# Patient Record
Sex: MALE | Race: WHITE | NOT HISPANIC OR LATINO | Employment: PART TIME | URBAN - METROPOLITAN AREA
[De-identification: names, ages, dates, MRNs, and addresses within clinical notes are randomized per-mention and may not be internally consistent; named-entity substitution may affect disease eponyms.]

---
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
## 2023-01-03 ENCOUNTER — VIRTUAL VISIT (OUTPATIENT)
Dept: PSYCHIATRY | Facility: CLINIC | Age: 18
End: 2023-01-03
Payer: COMMERCIAL

## 2023-01-03 DIAGNOSIS — F41.9 ANXIETY: ICD-10-CM

## 2023-01-03 DIAGNOSIS — F33.1 MAJOR DEPRESSIVE DISORDER, RECURRENT EPISODE, MODERATE (H): ICD-10-CM

## 2023-01-03 DIAGNOSIS — F60.9 CLUSTER B PERSONALITY DISORDER IN ADOLESCENT (H): ICD-10-CM

## 2023-01-03 DIAGNOSIS — F39 MOOD DISORDER (H): Primary | ICD-10-CM

## 2023-01-03 DIAGNOSIS — Z63.8 FAMILY DISCORD: ICD-10-CM

## 2023-01-03 DIAGNOSIS — F90.2 ATTENTION DEFICIT HYPERACTIVITY DISORDER (ADHD), COMBINED TYPE: ICD-10-CM

## 2023-01-03 PROCEDURE — 99214 OFFICE O/P EST MOD 30 MIN: CPT | Mod: GT | Performed by: STUDENT IN AN ORGANIZED HEALTH CARE EDUCATION/TRAINING PROGRAM

## 2023-01-03 RX ORDER — GUANFACINE 3 MG/1
3 TABLET, EXTENDED RELEASE ORAL AT BEDTIME
Qty: 30 TABLET | Refills: 3 | Status: SHIPPED | OUTPATIENT
Start: 2023-01-03 | End: 2023-02-21

## 2023-01-03 RX ORDER — FLUOXETINE 40 MG/1
40 CAPSULE ORAL EVERY EVENING
Qty: 30 CAPSULE | Refills: 3 | Status: SHIPPED | OUTPATIENT
Start: 2023-01-03 | End: 2023-02-21

## 2023-01-03 SDOH — SOCIAL STABILITY - SOCIAL INSECURITY: OTHER SPECIFIED PROBLEMS RELATED TO PRIMARY SUPPORT GROUP: Z63.8

## 2023-01-03 NOTE — PATIENT INSTRUCTIONS
**For crisis resources, please see the information at the end of this document**   Patient Education    Thank you for coming to the [facility].    Lab Testing:  If you had lab testing today and your results are reassuring or normal they will be mailed to you or sent through [employer] within 7 days. If the lab tests need quick action we will call you with the results. The phone number we will call with results is # [phone] (home) . If this is not the best number please call our clinic and change the number.    Medication Refills:  If you need any refills please call your pharmacy and they will contact us. Our fax number for refills is [phone]. Please allow three business for refill processing. If you need to  your refill at a new pharmacy, please contact the new pharmacy directly. The new pharmacy will help you get your medications transferred.     Scheduling:  If you have any concerns about today's visit or wish to schedule another appointment please call our office during normal business hours [phone] (8-5:00 M-F)    Contact Us:  Please call [phone] during business hours (8-5:00 M-F).  If after clinic hours, or on the weekend, please call  [phone].    Financial Assistance [phone]  [facility] Billing [phone]  Central Billing Office, [facility]: [phone]  [facility] Billing [phone]  Medical Records [phone]  [facility] Patient Bill of Rights [email or web address]       MENTAL HEALTH CRISIS NUMBERS:  For a medical emergency please call  911 or go to the nearest ER.     [facility]:   [facility] -[phone]   Crisis Residence [facility] -[phone]   Walk-In Counseling Center [facility] -[phone]   COPE 24/7 [facility] Mobile Team -[phone] (adults)/[phone] (child)  CHILD: [facility] needs assessment team - [phone]       [facility]:   [facility] - [phone]   Walk-in counseling [facility] - [phone]   Walk-in counseling [facility] - [phone]   Crisis Residence [facility] - [phone]  Urgent Care Adult Mental Tbsrqy-[phone] mobile unit/ 24/7 crisis line    National Crisis Numbers:   National Suicide Prevention Lifeline: 7-578-795-TALK (595-259-0953)  Poison Control Center - 1-320-414-7806  [facility] [place]/resources for a list of additional resources (SOS)  Trans Lifeline a hotline for transgender people 2-152-764-5270  The [employer] a hotline for LGBT youth 1-221.220.7479  Crisis Text Line: For any crisis 24/7   To: 247877  see www.crisistextline.org  - IF MAKING A CALL FEELS TOO HARD, send a text!         Again thank you for choosing [facility] and please let us know how we can best partner with you to improve you and your family's health.    You may be receiving a survey regarding this appointment. We would love to have your feedback, both positive and negative. The survey is done by an external company, so your answers are anonymous.

## 2023-01-03 NOTE — PROGRESS NOTES
Kyle Bhakta is a 17 year old male who is being evaluated via a billable video visit.        How would you like to obtain your AVS? through [employer]  Primary method for receiving video invitation: Text to cell phone: [phone]  If the video visit is dropped, the invitation should be resent by: Text to cell phone: [phone]  Will anyone else be joining your video visit? No      Type of service:  Video Visit    Video-Visit Details    Video Start Time: 1:40 PM    Video End Time:1:53 PM  Originating Location (pt. Location): Other patient's workplace    Distant Location (provider location):  [facility]    Platform used for Video Visit: Juanita
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
## 2023-01-07 ENCOUNTER — HEALTH MAINTENANCE LETTER (OUTPATIENT)
Age: 18
End: 2023-01-07

## 2023-02-04 ENCOUNTER — OFFICE VISIT (OUTPATIENT)
Dept: URGENT CARE | Facility: URGENT CARE | Age: 18
End: 2023-02-04
Payer: COMMERCIAL

## 2023-02-04 VITALS
RESPIRATION RATE: 22 BRPM | SYSTOLIC BLOOD PRESSURE: 138 MMHG | TEMPERATURE: 97.6 F | DIASTOLIC BLOOD PRESSURE: 87 MMHG | HEART RATE: 79 BPM | BODY MASS INDEX: 44.75 KG/M2 | WEIGHT: 315 LBS | OXYGEN SATURATION: 96 %

## 2023-02-04 DIAGNOSIS — J01.90 ACUTE SINUSITIS WITH SYMPTOMS > 10 DAYS: ICD-10-CM

## 2023-02-04 DIAGNOSIS — J02.0 STREPTOCOCCAL PHARYNGITIS: Primary | ICD-10-CM

## 2023-02-04 DIAGNOSIS — R07.0 THROAT PAIN: ICD-10-CM

## 2023-02-04 DIAGNOSIS — Z20.822 SUSPECTED 2019 NOVEL CORONAVIRUS INFECTION: ICD-10-CM

## 2023-02-04 LAB — DEPRECATED S PYO AG THROAT QL EIA: POSITIVE

## 2023-02-04 PROCEDURE — U0003 INFECTIOUS AGENT DETECTION BY NUCLEIC ACID (DNA OR RNA); SEVERE ACUTE RESPIRATORY SYNDROME CORONAVIRUS 2 (SARS-COV-2) (CORONAVIRUS DISEASE [COVID-19]), AMPLIFIED PROBE TECHNIQUE, MAKING USE OF HIGH THROUGHPUT TECHNOLOGIES AS DESCRIBED BY CMS-2020-01-R: HCPCS | Performed by: FAMILY MEDICINE

## 2023-02-04 PROCEDURE — 87880 STREP A ASSAY W/OPTIC: CPT | Performed by: FAMILY MEDICINE

## 2023-02-04 PROCEDURE — 99213 OFFICE O/P EST LOW 20 MIN: CPT | Mod: CS | Performed by: FAMILY MEDICINE

## 2023-02-04 PROCEDURE — U0005 INFEC AGEN DETEC AMPLI PROBE: HCPCS | Performed by: FAMILY MEDICINE

## 2023-02-04 NOTE — PROGRESS NOTES
Assessment & Plan     Streptococcal pharyngitis  Differential discussed in detail.  Symptoms likely secondary to strep pharyngitis along with acute sinusitis.  Augmentin prescribed, common side effects discussed.  Recommended well hydration, warm fluids, steam inhalation, humidifier use and over-the-counter analgesia/antitussive.  Return criteria explained in detail.  Patient understood and in agreement with above plan.  All questions answered.  - amoxicillin-clavulanate (AUGMENTIN) 875-125 MG tablet; Take 1 tablet by mouth 2 times daily for 10 days    Suspected 2019 novel coronavirus infection  - Symptomatic COVID-19 Virus (Coronavirus) by PCR Nose    Acute sinusitis with symptoms > 10 days  - amoxicillin-clavulanate (AUGMENTIN) 875-125 MG tablet; Take 1 tablet by mouth 2 times daily for 10 days    Throat pain  - Streptococcus A Rapid Screen w/Reflex to PCR      Gonzalo Montana MD  [facility] URG[facility]   Ana is a 17 year old presenting for the following health issues:  Sinus Problem (Sinus congestion, headache, and dizziness ), Cough (Coughing, lightheaded for about 3 days), and Pharyngitis (Sore throat.)      HPI     Concern -   Onset: >1 week  Description: ear pain, sinu congestion, cough, sore throat, weakness, fatigue, nausea, hard time remembering   Intensity: moderate  Progression of Symptoms:  worsening  Accompanying Signs & Symptoms:none  Therapies tried and outcome: sleep, tylenol   Works in         Review of Systems   Constitutional, HEENT, cardiovascular, pulmonary, gi and gu systems are negative, except as otherwise noted.      Objective    /87 (BP Location: Right arm, Patient Position: Sitting, Cuff Size: Adult Large)   Pulse 79   Temp 97.6  F (36.4  C) (Tympanic)   Resp 22   Wt 149.7 kg (330 lb)   SpO2 96%   BMI 44.75 kg/m    Body mass index is 44.75 kg/m .  Physical Exam   GENERAL: alert, no distress and obese  EYES: Eyes grossly normal to  inspection, PERRL and conjunctivae and sclerae normal  HENT: normal cephalic/atraumatic, ear canals and TM's normal, nasal mucosa edematous , rhinorrhea yellow, oral mucous membranes moist, oropharxnx crowded, tonsillar hypertrophy and tonsillar erythema, maxillary sinus tender bilaterally  NECK: no adenopathy, no asymmetry, masses, or scars and thyroid normal to palpation  RESP: lungs clear to auscultation - no rales, rhonchi or wheezes  CV: regular rates and rhythm, normal S1 S2, no S3 or S4 and no murmur, click or rub  MS: no gross musculoskeletal defects noted, no edema  SKIN: no suspicious lesions or rashes  NEURO: Normal strength and tone, mentation intact and speech normal  PSYCH: mentation appears normal, affect normal/bright        Results for orders placed or performed in visit on 02/04/23   Streptococcus A Rapid Screen w/Reflex to PCR     Status: Abnormal    Specimen: Throat; Swab   Result Value Ref Range    Group A Strep antigen Positive (A) Negative

## 2023-02-04 NOTE — LETTER
February 10, 2023      Ana SYLVESTER Bhakta  [address]   [place] [address]        Dear ,    We are writing to inform you of your test results.    {results letter list:[id number]}    Resulted Orders   Symptomatic COVID-19 Virus (Coronavirus) by PCR Nose   Result Value Ref Range    SARS CoV2 PCR Negative Negative      Comment:      NEGATIVE: SARS-CoV-2 (COVID-19) RNA not detected, presumed negative.    Narrative    Testing was performed using the AptOneSpot SARS-CoV-2 Assay on the  [employer] Instrument System. Additional information about this  Emergency Use Authorization (EUA) assay can be found via the Lab  Guide. This test should be ordered for the detection of SARS-CoV-2 in  individuals who meet SARS-CoV-2 clinical and/or epidemiological  criteria. Test performance is unknown in asymptomatic patients. This  test is for in vitro diagnostic use under the FDA EUA for  laboratories certified under CLIA to perform high complexity testing.  This test has not been FDA cleared or approved. A negative result  does not rule out the presence of PCR inhibitors in the specimen or  target RNA in concentration below the limit of detection for the  assay. The possibility of a false negative should be considered if  the patient's recent exposure or clinical presentation suggests  COVID-19. This test was validated by the [facility]. This laboratory is certified under  the Clinical Laboratory Improvement Amendments of 1988 (CLIA-88) as  qualified to perform high complexity laboratory testing.       If you have any questions or concerns, please call the clinic at the number listed above.       Sincerely,      Gonzalo Montana MD

## 2023-02-05 LAB — SARS-COV-2 RNA RESP QL NAA+PROBE: NEGATIVE

## 2023-02-16 ENCOUNTER — TELEPHONE (OUTPATIENT)
Dept: PSYCHIATRY | Facility: CLINIC | Age: 18
End: 2023-02-16

## 2023-02-16 NOTE — TELEPHONE ENCOUNTER
" Health Call Center    Phone Message    May a detailed message be left on voicemail: yes     Reason for Call: Other: Pt called to see if he could talk to Dr. Nobles about possibly starting on an adhd medication, he states that he can't remember what happens day to day and is \"out of it\" a lot. Please advise     Action Taken: Other: p ashleyb psychiatry    Travel Screening: Not Applicable                                                                      "

## 2023-02-17 NOTE — TELEPHONE ENCOUNTER
Hi Schedulers,     Please call patient back and schedule for sooner follow-up with Dr. Nobles to discuss ADHD medications.    MADELEINE Alegria only.  I assumed that this would need an appointment to discuss further.    Josefina

## 2023-02-21 ENCOUNTER — VIRTUAL VISIT (OUTPATIENT)
Dept: PSYCHIATRY | Facility: CLINIC | Age: 18
End: 2023-02-21
Payer: COMMERCIAL

## 2023-02-21 DIAGNOSIS — F41.9 ANXIETY: ICD-10-CM

## 2023-02-21 DIAGNOSIS — F90.2 ATTENTION DEFICIT HYPERACTIVITY DISORDER (ADHD), COMBINED TYPE: Primary | ICD-10-CM

## 2023-02-21 DIAGNOSIS — F33.1 MAJOR DEPRESSIVE DISORDER, RECURRENT EPISODE, MODERATE (H): ICD-10-CM

## 2023-02-21 PROCEDURE — 99214 OFFICE O/P EST MOD 30 MIN: CPT | Mod: VID | Performed by: STUDENT IN AN ORGANIZED HEALTH CARE EDUCATION/TRAINING PROGRAM

## 2023-02-21 RX ORDER — LISDEXAMFETAMINE DIMESYLATE 30 MG/1
30 CAPSULE ORAL EVERY MORNING
Qty: 30 CAPSULE | Refills: 0 | Status: SHIPPED | OUTPATIENT
Start: 2023-02-21 | End: 2023-05-23

## 2023-02-21 RX ORDER — FLUOXETINE 40 MG/1
40 CAPSULE ORAL EVERY EVENING
Qty: 30 CAPSULE | Refills: 3 | Status: SHIPPED | OUTPATIENT
Start: 2023-02-21 | End: 2023-05-23

## 2023-02-21 RX ORDER — GUANFACINE 3 MG/1
3 TABLET, EXTENDED RELEASE ORAL AT BEDTIME
Qty: 30 TABLET | Refills: 3 | Status: SHIPPED | OUTPATIENT
Start: 2023-02-21 | End: 2023-05-23

## 2023-02-21 NOTE — PATIENT INSTRUCTIONS
**For crisis resources, please see the information at the end of this document**   Patient Education    Thank you for coming to the [facility].    Lab Testing:  If you had lab testing today and your results are reassuring or normal they will be mailed to you or sent through [employer] within 7 days. If the lab tests need quick action we will call you with the results. The phone number we will call with results is # [phone] (home) . If this is not the best number please call our clinic and change the number.    Medication Refills:  If you need any refills please call your pharmacy and they will contact us. Our fax number for refills is [phone]. Please allow three business for refill processing. If you need to  your refill at a new pharmacy, please contact the new pharmacy directly. The new pharmacy will help you get your medications transferred.     Scheduling:  If you have any concerns about today's visit or wish to schedule another appointment please call our office during normal business hours [phone] (8-5:00 M-F)    Contact Us:  Please call [phone] during business hours (8-5:00 M-F).  If after clinic hours, or on the weekend, please call  [phone].    Financial Assistance [phone]  [facility] Billing [phone]  Central Billing Office, [facility]: [phone]  [facility] Billing [phone]  Medical Records [phone]  [facility] Patient Bill of Rights [email or web address]       MENTAL HEALTH CRISIS NUMBERS:  For a medical emergency please call  911 or go to the nearest ER.     [facility]:   [facility] -[phone]   Crisis Residence [facility] -[phone]   Walk-In Counseling Center [facility] -[phone]   COPE 24/7 [facility] Mobile Team -[phone] (adults)/[phone] (child)  CHILD: [facility] needs assessment team - [phone]       The Medical Center:   [facility] - [phone]   Walk-in counseling [facility] - [phone]   Walk-in counseling [facility] - [phone]   Crisis Residence [facility] - [phone]  Urgent Care Adult [facility]-[phone] mobile unit/ 24/7 crisis line    National Crisis Numbers:   National Suicide Prevention Lifeline: 5-892-247-TALK (012-828-2713)  Poison Control Center - 4-687-729-1808  [employer]/resources for a list of additional resources (SOS)  Trans Lifeline a hotline for transgender people 3-152-979-6575  The [employer] a hotline for LGBT youth 1-871.738.2510  Crisis Text Line: For any crisis 24/7   To: 582078  see www.crisistextline.org  - IF MAKING A CALL FEELS TOO HARD, send a text!         Again thank you for choosing [facility] and please let us know how we can best partner with you to improve you and your family's health.    You may be receiving a survey regarding this appointment. We would love to have your feedback, both positive and negative. The survey is done by an external company, so your answers are anonymous.

## 2023-02-21 NOTE — Clinical Note
Rigo Batista  Kyle's visit is ready to be signed. We're trying Vyvanse again, since it previously worked for Kyle in 2015. Seems like it was discontinued because of other mood lability issues going on at the time. He does seem to easily get off-track, so it's worth giving a stimulant a try again now that his mood has been well managed over the past year.  Thanks!  Raji
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
## 2023-02-21 NOTE — PROGRESS NOTES
Kyle Bhakta is a 17 year old male who is being evaluated via a billable video visit.        How would you like to obtain your AVS? through [facility] [employer]  Primary method for receiving video invitation: Text to cell phone: [phone]  If the video visit is dropped, the invitation should be resent by: Text to cell phone: [phone]  Will anyone else be joining your video visit? No      Type of service:  Video Visit    Video-Visit Details    Video Start Time: 1:30 PM    Video End Time:2:00 PM  Originating Location (pt. Location): Home    Distant Location (provider location):  [facility]    Platform used for Video Visit: Alana

## 2023-02-21 NOTE — Clinical Note
Rigo Rocha,  I prescribed Vyvanse for Kyle since it was helpful for him several years ago. Insurance is asking for a prior auth for it. Do you know where I get the paperwork to fill out?  Thanks,  Raji

## 2023-02-22 ENCOUNTER — TELEPHONE (OUTPATIENT)
Dept: PSYCHIATRY | Facility: CLINIC | Age: 18
End: 2023-02-22

## 2023-02-22 NOTE — TELEPHONE ENCOUNTER
Dear PA team,      We have received a prior authorization request for the following from the pt pharmacy.     Medication:    Disp Refills Start End RANDY   lisdexamfetamine (VYVANSE) 30 MG capsule 30 capsule 0 2/21/2023  --   Sig - Route: Take 1 capsule (30 mg) by mouth every morning - Oral   Sent to pharmacy as: Lisdexamfetamine Dimesylate 30 MG Oral Capsule (VYVANSE)   Class: E-Prescribe   Earliest Fill Date: 2/21/2023   Order: [id number]   Cosign for Ordering: Required by Lynne Burdick MD   E-Prescribing Status: Receipt confirmed by pharmacy (2/21/2023  2:08 PM CST)         Additional information: None     Please process PA request.     Thank you,    Vira Carmona, CMA

## 2023-02-27 NOTE — TELEPHONE ENCOUNTER
Prior Authorization Approval          Authorization Effective Date: 1/27/2023  Authorization Expiration Date: 2/26/2024  Medication: lisdexamfetamine (VYVANSE) 30 MG capsule  Approved Dose/Quantity:   Reference #: [id number]   Insurance Company: [employer] - Phone [phone] Fax [phone]  Expected CoPay:       CoPay Card Available:      Foundation Assistance Needed:    Which Pharmacy is filling the prescription (Not needed for infusion/clinic administered): [id number] - [place], [place] - [facility] [address] AT [facility]  Pharmacy Notified: Yes  Patient Notified: No

## 2023-02-27 NOTE — TELEPHONE ENCOUNTER
Central Prior Authorization Team   Phone: [phone]      PA Initiation    Medication: lisdexamfetamine (VYVANSE) 30 MG capsule  Insurance Company: [employer] - Phone [phone] Fax [phone]  Pharmacy Filling the Rx: [id number] - [facility] [place], [place] - [address] AT [facility]  Filling Pharmacy Phone: [phone]  Filling Pharmacy Fax:    Start Date: 2/26/2023

## 2023-02-28 ENCOUNTER — VIRTUAL VISIT (OUTPATIENT)
Dept: PSYCHIATRY | Facility: CLINIC | Age: 18
End: 2023-02-28
Payer: COMMERCIAL

## 2023-02-28 DIAGNOSIS — R69 DIAGNOSIS DEFERRED: Primary | ICD-10-CM

## 2023-02-28 PROCEDURE — 99207 PR NON-BILLABLE SERV PER CHARTING: CPT | Mod: VID | Performed by: STUDENT IN AN ORGANIZED HEALTH CARE EDUCATION/TRAINING PROGRAM

## 2023-02-28 NOTE — PROGRESS NOTES
Visit today had previously been scheduled as a follow-up, but a sooner follow-up was requested last week and we had a telehealth visit on 2/21/2023. Kyle had nothing new to report today other than the Vyvanse which was prescribed was declined by insurance, due to requiring a prior auth. Will work to have this filled out.    No changes to treatment plan, and will follow-up as scheduled on 3/28/2023.    This will be a non-billable visit.     Raji Nobles MD  Child and Adolescent Psychiatry Fellow, PGY-5    Patient was seen via telemedicine ([facility]) and was not staffed. Supervisor is Dr. Lynne Burdick, who will sign the note.  I did not see this patient directly. I have reviewed the documentation and I agree with the resident's plan of care.     Lynne Burdick MD

## 2023-02-28 NOTE — PATIENT INSTRUCTIONS
**For crisis resources, please see the information at the end of this document**   Patient Education    Thank you for coming to the [facility].    Lab Testing:  If you had lab testing today and your results are reassuring or normal they will be mailed to you or sent through [employer] within 7 days. If the lab tests need quick action we will call you with the results. The phone number we will call with results is # [phone] (home) . If this is not the best number please call our clinic and change the number.    Medication Refills:  If you need any refills please call your pharmacy and they will contact us. Our fax number for refills is [phone]. Please allow three business for refill processing. If you need to  your refill at a new pharmacy, please contact the new pharmacy directly. The new pharmacy will help you get your medications transferred.     Scheduling:  If you have any concerns about today's visit or wish to schedule another appointment please call our office during normal business hours [phone] (8-5:00 M-F)    Contact Us:  Please call [phone] during business hours (8-5:00 M-F).  If after clinic hours, or on the weekend, please call  [phone].    Financial Assistance [phone]  [facility] Billing [phone]  Central Billing Office, [facility]: [phone]  [facility] Billing [phone]  Medical Records [phone]  [facility] Patient Bill of Rights [email or web address]       MENTAL HEALTH CRISIS NUMBERS:  For a medical emergency please call  911 or go to the nearest ER.     [facility]:   [facility] -[phone]   Crisis Residence [facility] Residence -[phone]   Walk-In Counseling Center [facility] -[phone]   COPE 24/7 [place] Mobile Team -[phone] (adults)/[phone] (child)  CHILD: [facility] needs assessment team - [phone]       [facility]:   [facility] - [phone]   Walk-in counseling [facility] - [phone]   Walk-in counseling [facility] - [phone]   Crisis Residence [facility] Res[facility] - [phone]  Urgent Care Adult Mental Nhvcsv-[phone] mobile unit/ 24/7 crisis line    National Crisis Numbers:   National Suicide Prevention Lifeline: 4-666-120-TALK (451-241-3667)  Poison Control Center - 4-849-907-6821  Infrascale/resources for a list of additional resources (SOS)  Trans Lifeline a hotline for transgender people 7-270-349-7706  The [employer] Project a hotline for LGBT youth 1-588.351.5922  Crisis Text Line: For any crisis 24/7   To: 490409  see www.crisistextline.org  - IF MAKING A CALL FEELS TOO HARD, send a text!         Again thank you for choosing [facility] and please let us know how we can best partner with you to improve you and your family's health.    You may be receiving a survey regarding this appointment. We would love to have your feedback, both positive and negative. The survey is done by an external company, so your answers are anonymous.

## 2023-04-03 ENCOUNTER — OFFICE VISIT (OUTPATIENT)
Dept: URGENT CARE | Facility: URGENT CARE | Age: 18
End: 2023-04-03
Payer: COMMERCIAL

## 2023-04-03 VITALS
HEART RATE: 85 BPM | OXYGEN SATURATION: 99 % | RESPIRATION RATE: 14 BRPM | BODY MASS INDEX: 43.93 KG/M2 | TEMPERATURE: 97.8 F | WEIGHT: 315 LBS | DIASTOLIC BLOOD PRESSURE: 85 MMHG | SYSTOLIC BLOOD PRESSURE: 122 MMHG

## 2023-04-03 DIAGNOSIS — H10.33 ACUTE BACTERIAL CONJUNCTIVITIS OF BOTH EYES: Primary | ICD-10-CM

## 2023-04-03 PROCEDURE — 99213 OFFICE O/P EST LOW 20 MIN: CPT | Performed by: FAMILY MEDICINE

## 2023-04-03 RX ORDER — CIPROFLOXACIN HYDROCHLORIDE 3.5 MG/ML
1-2 SOLUTION/ DROPS TOPICAL EVERY 4 HOURS
Qty: 5 ML | Refills: 0 | Status: SHIPPED | OUTPATIENT
Start: 2023-04-03 | End: 2023-07-08

## 2023-04-03 NOTE — PROGRESS NOTES
Assessment & Plan     Acute bacterial conjunctivitis of both eyes  History and clinical presentation are consistent with acute conjunctivitis.  Patient was started on antibiotics today.  - ciprofloxacin (CILOXAN) 0.3 % ophthalmic solution  Dispense: 5 mL; Refill: 0        Return in about 1 week (around 4/10/2023) for Follow-up visit with PCP-  if symptoms failed to improve.    Concepción Tan MD  [facility]    Daphne Perez is a 17 year old male who presents to clinic today for the following health issues:  Chief Complaint   Patient presents with     Eye Problem     Per pt , he was looking after his sister who was recently Dx with pink eye and the flu, woke this morning with discharge and unable to open eyes without clearing the goop.     work note     Pt requires a note to be out today until his symptoms clear, tomorrow RTW 4/4.      HPI    Eye Problem    Onset of symptoms was 2 day(s) ago.   Location: both eyes   Course of illness is improving.    Severity moderate  Current and Associated symptoms: discharge, mattering, pain  Treatment measures tried include flushed with water , warm packs and abx ointment  Context: Pink eye exposure  Little sister has pink eye.     Review of Systems  Constitutional, HEENT, cardiovascular, pulmonary, gi and gu systems are negative, except as otherwise noted.      Objective    /85   Pulse 85   Temp 97.8  F (36.6  C) (Tympanic)   Resp 14   Wt 147 kg (324 lb)   SpO2 99%   BMI 43.93 kg/m    Physical Exam   GENERAL: healthy, alert and no distress  EYES: PERRL, EOMI and conjunctiva/corneas- conjunctival injection each eye. Crusting on both eyelashes.   RESP: lungs clear to auscultation - no rales, rhonchi or wheezes  CV: regular rate and rhythm, normal S1 S2, no S3 or S4, no murmur, click or rub, no peripheral edema and peripheral pulses strong    No results found for any visits on 04/03/23.

## 2023-04-03 NOTE — LETTER
April 3, 2023      Kyle Bhakta  [id number] [place] [address]        To Whom It May Concern:    Kyle Bhakta was seen in our clinic. He was diagnosed with Pink Eye (Bacterial conjunctivitis). He was started on antibiotics today. He may return to work 04/04/2023 without restrictions.      Sincerely,        Concepción Tan MD

## 2023-05-22 ENCOUNTER — OFFICE VISIT (OUTPATIENT)
Dept: URGENT CARE | Facility: URGENT CARE | Age: 18
End: 2023-05-22
Payer: MEDICAID

## 2023-05-22 VITALS
DIASTOLIC BLOOD PRESSURE: 77 MMHG | HEART RATE: 84 BPM | TEMPERATURE: 97.5 F | RESPIRATION RATE: 18 BRPM | SYSTOLIC BLOOD PRESSURE: 122 MMHG | WEIGHT: 315 LBS | BODY MASS INDEX: 45.42 KG/M2 | OXYGEN SATURATION: 96 %

## 2023-05-22 DIAGNOSIS — R06.2 WHEEZING: ICD-10-CM

## 2023-05-22 DIAGNOSIS — J01.10 ACUTE NON-RECURRENT FRONTAL SINUSITIS: Primary | ICD-10-CM

## 2023-05-22 PROCEDURE — 99214 OFFICE O/P EST MOD 30 MIN: CPT | Performed by: NURSE PRACTITIONER

## 2023-05-22 RX ORDER — ALBUTEROL SULFATE 90 UG/1
2 AEROSOL, METERED RESPIRATORY (INHALATION) EVERY 6 HOURS PRN
Qty: 18 G | Refills: 0 | Status: SHIPPED | OUTPATIENT
Start: 2023-05-22

## 2023-05-22 RX ORDER — ALBUTEROL SULFATE 90 UG/1
2 AEROSOL, METERED RESPIRATORY (INHALATION) EVERY 6 HOURS PRN
Qty: 18 G | Refills: 0 | Status: SHIPPED | OUTPATIENT
Start: 2023-05-22 | End: 2023-05-22

## 2023-05-22 RX ORDER — BENZONATATE 200 MG/1
200 CAPSULE ORAL 3 TIMES DAILY PRN
Qty: 15 CAPSULE | Refills: 0 | Status: SHIPPED | OUTPATIENT
Start: 2023-05-22 | End: 2023-07-08

## 2023-05-22 NOTE — PROGRESS NOTES
SUBJECTIVE:   Kyle Bhakta is a 17 year old male presenting with a chief complaint of cold symptoms.   Onset of symptoms was 1 week +  Symptoms are cough, runny nose, hoarse voice, sore throat, congestion,fatigue, wheezing and SOB.   Patient states he has used friends rescue inhaler which helped some       Past Medical History:   Diagnosis Date     ADHD (attention deficit hyperactivity disorder)      Mood disorder (H)      Current Outpatient Medications   Medication Sig Dispense Refill     FLUoxetine (PROZAC) 40 MG capsule Take 1 capsule (40 mg) by mouth every evening 30 capsule 3     fluticasone (FLONASE) 50 MCG/ACT nasal spray Spray 1 spray into both nostrils daily       guanFACINE HCl (INTUNIV) 3 MG TB24 24 hr tablet Take 1 tablet (3 mg) by mouth At Bedtime 30 tablet 3     lisdexamfetamine (VYVANSE) 30 MG capsule Take 1 capsule (30 mg) by mouth every morning 30 capsule 0     Melatonin 5 MG CHEW Take 10 mg by mouth nightly as needed (sleep)       Vitamin D3 (CHOLECALCIFEROL) 25 mcg (1000 units) tablet Take 4 tablets (100 mcg) by mouth daily 360 tablet 3     amoxicillin-clavulanate (AUGMENTIN) 875-125 MG tablet Take 1 tablet by mouth 2 times daily (Patient not taking: Reported on 5/22/2023) 20 tablet 0     ciprofloxacin (CILOXAN) 0.3 % ophthalmic solution Place 1-2 drops into both eyes every 4 hours (Patient not taking: Reported on 5/22/2023) 5 mL 0     Social History     Tobacco Use     Smoking status: Never     Smokeless tobacco: Never     Tobacco comments:     vapes   Vaping Use     Vaping status: Former   Substance Use Topics     Alcohol use: No       ROS:  Review of systems negative except as stated above.    OBJECTIVE:  /77   Pulse 84   Temp 97.5  F (36.4  C) (Tympanic)   Resp 18   Wt (!) 152 kg (335 lb)   SpO2 96%   BMI 45.42 kg/m    GENERAL APPEARANCE:  alert and no distress  EYES: EOMI,  PERRL, conjunctiva clear  HENT: ear canals and TM's normal.  Nose and mouth without ulcers, erythema  or lesions  NECK: supple, nontender, no lymphadenopathy  RESP: lungs clear to auscultation - no rales, rhonchi or wheezes  CV: regular rates and rhythm, normal S1 S2, no murmur noted  SKIN: no suspicious lesions or rashes    ASSESSMENT:  (J01.10) Acute non-recurrent frontal sinusitis  (primary encounter diagnosis)   amoxicillin-clavulanate (AUGMENTIN) 875-125 MG tablet     (R06.2) Wheezing  Plan:   benzonatate (TESSALON) 200 MG capsule,   albuterol (PROAIR HFA/PROVENTIL HFA/VENTOLIN HFA) 108 (90 Base) MCG/ACT inhaler,     Fluids, Rest and Vaporizer  Home treat and monitor sx call if new or worsening       SUSANA Lott CNP

## 2023-05-22 NOTE — LETTER
May 22, 2023      Kyle PHAN Yony  [id number]   [place] [id number]        To Whom It May Concern:    Kyle Bhakta  was seen on 5/22/23  Please excuse him due to illness.        Sincerely,        SUSANA Lott CNP

## 2023-05-23 ENCOUNTER — TELEPHONE (OUTPATIENT)
Dept: PSYCHIATRY | Facility: CLINIC | Age: 18
End: 2023-05-23
Payer: MEDICAID

## 2023-05-23 DIAGNOSIS — F90.2 ATTENTION DEFICIT HYPERACTIVITY DISORDER (ADHD), COMBINED TYPE: ICD-10-CM

## 2023-05-23 DIAGNOSIS — F41.9 ANXIETY: ICD-10-CM

## 2023-05-23 DIAGNOSIS — F33.1 MAJOR DEPRESSIVE DISORDER, RECURRENT EPISODE, MODERATE (H): ICD-10-CM

## 2023-05-23 RX ORDER — GUANFACINE 3 MG/1
3 TABLET, EXTENDED RELEASE ORAL AT BEDTIME
Qty: 30 TABLET | Refills: 0 | Status: SHIPPED | OUTPATIENT
Start: 2023-05-23 | End: 2023-06-13

## 2023-05-23 RX ORDER — LISDEXAMFETAMINE DIMESYLATE 30 MG/1
30 CAPSULE ORAL EVERY MORNING
Qty: 30 CAPSULE | Refills: 0 | Status: SHIPPED | OUTPATIENT
Start: 2023-05-23 | End: 2023-06-13

## 2023-05-23 RX ORDER — FLUOXETINE 40 MG/1
40 CAPSULE ORAL EVERY EVENING
Qty: 30 CAPSULE | Refills: 0 | Status: SHIPPED | OUTPATIENT
Start: 2023-05-23 | End: 2023-06-13

## 2023-05-23 NOTE — TELEPHONE ENCOUNTER
Writer called patient to clarify the questions regarding how prescriptions are written. Dr. Nobles isn't covered under patient's insurance, request routed to Dr. Burdick for attending signature.

## 2023-05-23 NOTE — TELEPHONE ENCOUNTER
[facility] Call Center    Phone Message    May a detailed message be left on voicemail: yes     Reason for Call: Medication Refill Request    Has the patient contacted the pharmacy for the refill? Yes   Name of medication being requested: lisdexamfetamine (VYVANSE) 30 MG capsule  FLUoxetine (PROZAC) 40 MG capsule  guanFACINE HCl (INTUNIV) 3 MG TB24 24 hr tablet  Provider who prescribed the medication: 5/23/23  Pharmacy: [id number] [address] [place] AT [facility]  Date medication is needed: 5/23/23     Patient states they are completely out of all medication and it to be refilled as soon as possible. They also wanted to talk with someone regarding the medication coverage with their insurance for the medications that they need to be refilled (does the order need to be written up different to ensure medication is covered).    Action Taken: Other: p mid[facility] psychiatry    Travel Screening: Not Applicable

## 2023-05-23 NOTE — TELEPHONE ENCOUNTER
Refill request received from: patient    Last appointment: 2/28/2023    RTC: 1 month    Canceled appointments: 3/28/2023 provider initiated    No Showed appointments: 0    Follow up scheduled: 0    Requested medication(s) (copy and paste last order information):    Disp Refills Start End RANDY   lisdexamfetamine (VYVANSE) 30 MG capsule 30 capsule 0 2/21/2023  --   Sig - Route: Take 1 capsule (30 mg) by mouth every morning - Oral   Sent to pharmacy as: Lisdexamfetamine Dimesylate 30 MG Oral Capsule (VYVANSE)   Class: E-Prescribe   Earliest Fill Date: 2/21/2023   Order: [id number]   Cosign for Ordering: Accepted by Lynne Burdick MD on 2/23/2023  8:36 PM   E-Prescribing Status: Receipt confirmed by pharmacy (2/21/2023  2:08 PM CST)      Disp Refills Start End RANDY   FLUoxetine (PROZAC) 40 MG capsule 30 capsule 3 2/21/2023  No   Sig - Route: Take 1 capsule (40 mg) by mouth every evening - Oral   Sent to pharmacy as: FLUoxetine HCl 40 MG Oral Capsule (PROzac)   Class: E-Prescribe   Order: [id number]   Cosign for Ordering: Accepted by Lynne Burdick MD on 2/23/2023  8:36 PM   E-Prescribing Status: Receipt confirmed by pharmacy (2/21/2023  2:08 PM CST)      Disp Refills Start End RANDY   guanFACINE HCl (INTUNIV) 3 MG TB24 24 hr tablet 30 tablet 3 2/21/2023  No   Sig - Route: Take 1 tablet (3 mg) by mouth At Bedtime - Oral   Sent to pharmacy as: guanFACINE HCl ER 3 MG Oral Tablet Extended Release 24 Hour (INTUNIV)   Class: E-Prescribe   Order: [id number]   Cosign for Ordering: Accepted by Lynne Burdick MD on 2/23/2023  8:36 PM   E-Prescribing Status: Receipt confirmed by pharmacy (2/21/2023  2:08 PM CST)           Date medication last filled per outside med information: guanfacine and fluoxetine: 5/20/2023 for 3 d/s, 5/12/2023 for 7 d/s, 4/4/2023 for 30 d/s  Vyvanse 3/14/2023 for 30 d/s    Months of medication pended per [facility] refill protocol: 1    Request was sent to Lynne Burdick for approval    If patient is due  "for follow up \"Appointment required for further refills [phone]\" was placed in the sig of the medication and encounter was routed to scheduling pool to encourage follow up.     Medication pended by: Vira Carmona CMA    "
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
## 2023-06-13 ENCOUNTER — VIRTUAL VISIT (OUTPATIENT)
Dept: PSYCHIATRY | Facility: CLINIC | Age: 18
End: 2023-06-13
Payer: COMMERCIAL

## 2023-06-13 DIAGNOSIS — F90.2 ATTENTION DEFICIT HYPERACTIVITY DISORDER (ADHD), COMBINED TYPE: ICD-10-CM

## 2023-06-13 DIAGNOSIS — F33.1 MAJOR DEPRESSIVE DISORDER, RECURRENT EPISODE, MODERATE (H): ICD-10-CM

## 2023-06-13 DIAGNOSIS — F41.9 ANXIETY: ICD-10-CM

## 2023-06-13 PROCEDURE — 99214 OFFICE O/P EST MOD 30 MIN: CPT | Mod: VID | Performed by: STUDENT IN AN ORGANIZED HEALTH CARE EDUCATION/TRAINING PROGRAM

## 2023-06-13 RX ORDER — LISDEXAMFETAMINE DIMESYLATE 30 MG/1
30 CAPSULE ORAL EVERY MORNING
Qty: 30 CAPSULE | Refills: 0 | Status: SHIPPED | OUTPATIENT
Start: 2023-06-22

## 2023-06-13 RX ORDER — GUANFACINE 3 MG/1
3 TABLET, EXTENDED RELEASE ORAL AT BEDTIME
Qty: 30 TABLET | Refills: 5 | Status: SHIPPED | OUTPATIENT
Start: 2023-06-13

## 2023-06-13 RX ORDER — FLUOXETINE 40 MG/1
40 CAPSULE ORAL EVERY EVENING
Qty: 30 CAPSULE | Refills: 5 | Status: SHIPPED | OUTPATIENT
Start: 2023-06-13
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
## 2023-06-13 NOTE — PATIENT INSTRUCTIONS
**For crisis resources, please see the information at the end of this document**   Patient Education    Thank you for coming to the [facility].    Lab Testing:  If you had lab testing today and your results are reassuring or normal they will be mailed to you or sent through [employer] within 7 days. If the lab tests need quick action we will call you with the results. The phone number we will call with results is # [phone] (home) . If this is not the best number please call our clinic and change the number.    Medication Refills:  If you need any refills please call your pharmacy and they will contact us. Our fax number for refills is [phone]. Please allow three business for refill processing. If you need to  your refill at a new pharmacy, please contact the new pharmacy directly. The new pharmacy will help you get your medications transferred.     Scheduling:  If you have any concerns about today's visit or wish to schedule another appointment please call our office during normal business hours [phone] (8-5:00 M-F)    Contact Us:  Please call [phone] during business hours (8-5:00 M-F).  If after clinic hours, or on the weekend, please call  [phone].    Financial Assistance [phone]  [facility] Billing [phone]  Central Billing Office, [facility]: [phone]  [place] Billing [phone]  Medical Records [phone]  [place] Patient Bill of Rights [email or web address]       MENTAL HEALTH CRISIS NUMBERS:  For a medical emergency please call  911 or go to the nearest ER.     [facility]:   [facility] -[phone]   Crisis Residence [facility] -[phone]   Walk-In Counseling Center [facility] -[phone]   COPE 24/7 [place] Mobile Team -[phone] (adults)/[phone] (child)  CHILD: [facility] needs assessment team - [phone]       Psychiatric:   [facility] - [phone]   Walk-in counseling [facility] - [phone]   Walk-in counseling [facility] - [phone]   Crisis Residence [facility] - [phone]  Urgent Care Adult Mental [facility]-[phone] mobile unit/ 24/7 crisis line    National Crisis Numbers:   National Suicide Prevention Lifeline: 5-997-680-TALK (094-365-0649)  Poison Control Center - 9-471-235-1980  Upplication/resources for a list of additional resources (SOS)  Trans Lifeline a hotline for transgender people 2-743-590-7677  The [employer] Project a hotline for LGBT youth 1-707.663.8343  Crisis Text Line: For any crisis 24/7   To: 944448  see www.crisistextline.org  - IF MAKING A CALL FEELS TOO HARD, send a text!         Again thank you for choosing [facility] and please let us know how we can best partner with you to improve you and your family's health.    You may be receiving a survey regarding this appointment. We would love to have your feedback, both positive and negative. The survey is done by an external company, so your answers are anonymous.

## 2023-06-13 NOTE — PROGRESS NOTES
Kyle Bhakta is a 17 year old male who is being evaluated via a billable video visit.        How would you like to obtain your AVS? through [employer]  Primary method for receiving video invitation: [employer]  If the video visit is dropped, the invitation should be resent by: Send to e-mail at: [email or web address]  Will anyone else be joining your video visit? Yes: text. How would they like to receive their invitation? Text to cell phone: [phone]      Type of service:  Video Visit    Video-Visit Details    Video Start Time: 2:35 PM    Video End Time:3:05 PM  Originating Location (pt. Location): Home    Distant Location (provider location):  [facility]    Platform used for Video Visit: Alana

## 2023-06-13 NOTE — Clinical Note
Kyle Perez's note is finished. We have a plan to transition him to the adult clinic at [place]. He will call them, and I'll follow-up with his ability to get into the clinic this week.  Thanks!  Raji

## 2023-07-02 ENCOUNTER — NURSE TRIAGE (OUTPATIENT)
Dept: NURSING | Facility: CLINIC | Age: 18
End: 2023-07-02
Payer: COMMERCIAL

## 2023-07-02 NOTE — TELEPHONE ENCOUNTER
Nurse Triage SBAR    Is this a 2nd Level Triage? NO    Situation/background: Patient injured his right foot.     Assessment: He slipped down the stairs while carrying bags and one of them landed on his foot. He cannot put pressure on the top of his foot, or move any of his toes. He cannot fully bear weight on his foot.     Protocol Recommended Disposition:   Emergency department    Recommendation: Advised ED now. Patient is agreeable with plan and verbalized understanding.     Martell Starr RN on 7/2/2023 at 6:00 PM    Reason for Disposition    Can't stand (bear weight) or walk    Additional Information    Negative: Serious injury with multiple fractures    Negative: [1] Major bleeding (e.g., actively dripping or spurting) AND [2] can't be stopped    Negative: Amputation    Negative: Looks like a dislocated joint (very crooked or deformed)    Negative: Sounds like a life-threatening emergency to the triager    Negative: Bullet wound, stabbed by knife, or other serious penetrating wound    Negative: Skin is split open or gaping (or length > 1/2 inch or 12 mm)    Negative: [1] Bleeding AND [2] won't stop after 10 minutes of direct pressure (using correct technique)    Negative: [1] Dirt in the wound AND [2] not removed with 15 minutes of scrubbing    Protocols used: FOOT AND ANKLE INJURY-A-AH

## 2023-07-08 ENCOUNTER — OFFICE VISIT (OUTPATIENT)
Dept: URGENT CARE | Facility: URGENT CARE | Age: 18
End: 2023-07-08
Payer: COMMERCIAL

## 2023-07-08 VITALS
BODY MASS INDEX: 45.42 KG/M2 | WEIGHT: 315 LBS | DIASTOLIC BLOOD PRESSURE: 73 MMHG | TEMPERATURE: 96.9 F | RESPIRATION RATE: 16 BRPM | SYSTOLIC BLOOD PRESSURE: 128 MMHG | OXYGEN SATURATION: 98 % | HEART RATE: 103 BPM

## 2023-07-08 DIAGNOSIS — F41.9 ANXIETY: ICD-10-CM

## 2023-07-08 DIAGNOSIS — F33.1 MAJOR DEPRESSIVE DISORDER, RECURRENT EPISODE, MODERATE (H): ICD-10-CM

## 2023-07-08 DIAGNOSIS — H10.13 ALLERGIC CONJUNCTIVITIS, BILATERAL: Primary | ICD-10-CM

## 2023-07-08 PROCEDURE — 99214 OFFICE O/P EST MOD 30 MIN: CPT | Performed by: PHYSICIAN ASSISTANT

## 2023-07-08 RX ORDER — OLOPATADINE HYDROCHLORIDE 2 MG/ML
1 SOLUTION/ DROPS OPHTHALMIC DAILY
Qty: 2.5 ML | Refills: 4 | Status: SHIPPED | OUTPATIENT
Start: 2023-07-08

## 2023-07-08 NOTE — PROGRESS NOTES
Assessment & Plan     Allergic conjunctivitis, bilateral  - olopatadine (PATADAY) 0.2 % ophthalmic solution; Place 0.05 mLs (1 drop) into both eyes daily  Discussed bacterial vs viral vs allergic conjunctivitis. With symptoms present for months after getting a new cat, symptoms most consistent with allergic etiology. I recommend eye drops daily to help ease itching and irritation. Be seen if symptoms worsen or fail to improve after 1 month. Follow up with eye doctor for vision screening.    Major depressive disorder, recurrent episode, moderate (H)    Anxiety    He also notes a history of PTSD. He is currently taking Prozac 40 mg, Vyvanse 30 mg and guanfacine 3 mg. PTSD did not involve needles and he does not feel his symptoms will be triggered by donating plasma.  He states that he has been stable on these doses for the last year and has been in remission with the symptoms for at least the last year.  He is interested in donating plasma and needs a note from a provider stating that this is okay.    Return in about 1 month (around 8/8/2023) for visit with primary care provider if not improving.     Angy Tucker PA-C  [facility]    Subjective   Kyle Bhakta is a 17 year old who presents for the following health issues     Patient presents with:  Eye Problem: Left eye crusted shut in the morning, blurry, pain, itchy  Had pink eye about 1 month ago- doesn't feel like it ever went away   Letter Request: Wants letter for donating blood- pt states he does not have a PCP at this time     TINY Perez presents clinic today for evaluation of left eye discharge.  Symptoms first began a month or 2 ago.  He states that he has discharge when he wakes up in the morning and intermittently throughout the day.  Left eye is itchy as well.  His mom recently got a cat and symptoms began shortly after. No pain.  His vision is blurry until discharge is wiped away.   He notes that he does have bilateral  blurring in his vision that has been persistent for several months.  He has not seen the eye doctor for vision evaluation recently.  No acute vision changes.    Review of Systems   ROS negative except as stated above.      Objective    /73   Pulse 103   Temp 96.9  F (36.1  C) (Tympanic)   Resp 16   Wt (!) 152 kg (335 lb)   SpO2 98%   BMI 45.42 kg/m    Physical Exam   GENERAL: healthy, alert and no distress  EYES: Eyes grossly normal to inspection, minimal conjunctival injection bilaterally, no discharge present on exam, PERRL and EOMI and sclerae normal  NECK: no adenopathy, no asymmetry, masses, or scars and thyroid normal to palpation  NEURO: Normal strength and tone, mentation intact and speech normal  PSYCH: mentation appears normal, affect normal/bright    No results found for any visits on 07/08/23.

## 2023-07-08 NOTE — LETTER
[facility] [place]  [address] [facility] [address]  Phone: [phone]        2023    Kyle Bhakta  [address] [facility] [address] [phone] (home)     :     2005      To Whom it May Concern:    Ana was seen in clinic today. His medication has helped to control his PTSD, anxiety and depression. He has been well managed without an exacerbation in these for the last year. He can safely donate blood.    Please contact me for questions or concerns.    Sincerely,      Angy Tucker PA-C

## 2023-07-15 ENCOUNTER — HEALTH MAINTENANCE LETTER (OUTPATIENT)
Age: 18
End: 2023-07-15

## 2023-08-07 ENCOUNTER — NURSE TRIAGE (OUTPATIENT)
Dept: NURSING | Facility: CLINIC | Age: 18
End: 2023-08-07
Payer: COMMERCIAL

## 2023-08-07 NOTE — TELEPHONE ENCOUNTER
"Pt reports \"a lot of mucous in a bowel movement today.\"  Diarrhea.  Onset yesterday (approx 18 hours ago).  Four loose bowel movements so far.  \"Feeling cold.\"  Uncertain of fever.    Drinking water.  \"Had a big iced coffee this morning.\"  Also reports eating solid foods last evening and this morning.  No nausea or vomiting.  No continuous abdominal pain.    Past week or two, having small hard pieces of stool.  Discussed potential drier stools due to extremely hot summer temperatures.    Discussed self-care measures for current diarrhea.  Also discussed prevention.  Pt verbalizes understanding.  Also agrees to schedule an establish-care appt to discuss his questions re occasional bouts of apparent viral gastroenteritis (states \"Four times in the past two years.\")    Warm transferred to a  for an appointment now.  OV appt scheduled to establish care.    Berna [facility] Nurse Advisor     Reason for Disposition   Mild to moderate diarrhea (multiple loose or watery stools per day), probably viral gastroenteritis    Additional Information   Negative: Shock suspected (very weak, limp, not moving, unresponsive, gray skin, etc)   Negative: Sounds like a life-threatening emergency to the triager   Negative: Vomiting and diarrhea both present   Negative: Blood in stool and without diarrhea   Negative: Unusual color of stool without diarrhea   Negative: Age < 12 weeks with fever 100.4 F (38.0 C) or higher rectally   Negative: Severe dehydration suspected (very dizzy when tries to stand or has fainted)   Negative: Fever and weak immune system (sickle cell disease, HIV, chemotherapy, organ transplant, chronic steroids, etc)   Negative: High-risk child (e.g., Crohn disease, UC, short bowel syndrome, recent abdominal surgery) with new-onset or worse diarrhea   Negative: Age < 1 month with 3 or more diarrhea stools (mucus, bad odor, increased looseness) in past 24 hours   Negative: Age < 3 months with severe watery " diarrhea (more than 10 per day)   Negative: Child sounds very sick or weak to the triager   Negative: Signs of dehydration (e.g., no urine in > 8 hours, no tears with crying, and very dry mouth) (Exception: only decreased urine. Consider fluid challenge and call-back).   Negative: Blood in the stool (Bring in a sample)   Negative: Fever > 105 F (40.6 C)   Negative: Abdominal pain present > 2 hours (Exception: pain clears with passage of each diarrhea stool)   Negative: Appendicitis suspected (e.g., constant pain > 2 hours, RLQ location, walks bent over holding abdomen, jumping makes pain worse, etc)   Negative: Very watery diarrhea combined with vomiting clear liquids 3 or more times   Negative: Age < 1 year with > 8 watery diarrhea stools in the last 8 hours   Negative: Note: All of the following symptoms suggest bacterial diarrhea, and the child may need a stool hemoccult, leukocytes, and culture   Negative: Loss of bowel control in child toilet-trained for > 1 year and occurs 3 or more times   Negative: Fever present > 3 days   Negative: Close contact with person or animal who has bacterial diarrhea and diarrhea is bad   Negative: Contact with reptile in previous 14 days and diarrhea is bad   Negative: Travel to country at risk for bacterial diarrhea within past month   Negative: Severe diarrhea while taking a medicine that could cause diarrhea (e.g., antibiotics)   Negative: Diarrhea persists > 2 weeks   Negative: Triager thinks child needs to be seen for non-urgent acute problem   Negative: Caller wants child seen for non-urgent problem   Negative: Loose stools are a chronic problem (present over 4 weeks)    Protocols used: Diarrhea-P-OH

## 2023-08-28 ENCOUNTER — OFFICE VISIT (OUTPATIENT)
Dept: FAMILY MEDICINE | Facility: CLINIC | Age: 18
End: 2023-08-28
Payer: COMMERCIAL

## 2023-08-28 VITALS
HEART RATE: 94 BPM | HEIGHT: 72 IN | WEIGHT: 315 LBS | BODY MASS INDEX: 42.66 KG/M2 | OXYGEN SATURATION: 97 % | SYSTOLIC BLOOD PRESSURE: 137 MMHG | RESPIRATION RATE: 18 BRPM | TEMPERATURE: 97.9 F | DIASTOLIC BLOOD PRESSURE: 85 MMHG

## 2023-08-28 DIAGNOSIS — R10.12 LEFT UPPER QUADRANT PAIN: ICD-10-CM

## 2023-08-28 DIAGNOSIS — Z11.59 NEED FOR HEPATITIS C SCREENING TEST: ICD-10-CM

## 2023-08-28 DIAGNOSIS — Z87.898 HISTORY OF SPLENOMEGALY: Primary | ICD-10-CM

## 2023-08-28 DIAGNOSIS — Z11.4 SCREENING FOR HIV (HUMAN IMMUNODEFICIENCY VIRUS): ICD-10-CM

## 2023-08-28 DIAGNOSIS — K59.00 CONSTIPATION, UNSPECIFIED CONSTIPATION TYPE: ICD-10-CM

## 2023-08-28 LAB
ALBUMIN SERPL BCG-MCNC: 4.6 G/DL (ref 3.5–5.2)
ALP SERPL-CCNC: 99 U/L (ref 40–129)
ALT SERPL W P-5'-P-CCNC: 30 U/L (ref 0–50)
ANION GAP SERPL CALCULATED.3IONS-SCNC: 11 MMOL/L (ref 7–15)
AST SERPL W P-5'-P-CCNC: 27 U/L (ref 0–35)
BASOPHILS # BLD AUTO: 0 10E3/UL (ref 0–0.2)
BASOPHILS NFR BLD AUTO: 0 %
BILIRUB SERPL-MCNC: 0.3 MG/DL
BUN SERPL-MCNC: 11.8 MG/DL (ref 6–20)
CALCIUM SERPL-MCNC: 9.9 MG/DL (ref 8.6–10)
CHLORIDE SERPL-SCNC: 102 MMOL/L (ref 98–107)
CREAT SERPL-MCNC: 0.71 MG/DL (ref 0.67–1.17)
DEPRECATED HCO3 PLAS-SCNC: 25 MMOL/L (ref 22–29)
EOSINOPHIL # BLD AUTO: 0.4 10E3/UL (ref 0–0.7)
EOSINOPHIL NFR BLD AUTO: 4 %
ERYTHROCYTE [DISTWIDTH] IN BLOOD BY AUTOMATED COUNT: 13.1 % (ref 10–15)
GFR SERPL CREATININE-BSD FRML MDRD: >90 ML/MIN/1.73M2
GLUCOSE SERPL-MCNC: 116 MG/DL (ref 70–99)
HCT VFR BLD AUTO: 46.3 % (ref 40–53)
HCV AB SERPL QL IA: NONREACTIVE
HGB BLD-MCNC: 15.4 G/DL (ref 13.3–17.7)
HIV 1+2 AB+HIV1 P24 AG SERPL QL IA: NONREACTIVE
IMM GRANULOCYTES # BLD: 0 10E3/UL
IMM GRANULOCYTES NFR BLD: 0 %
LIPASE SERPL-CCNC: 25 U/L (ref 13–60)
LYMPHOCYTES # BLD AUTO: 2.8 10E3/UL (ref 0.8–5.3)
LYMPHOCYTES NFR BLD AUTO: 26 %
MCH RBC QN AUTO: 26.8 PG (ref 26.5–33)
MCHC RBC AUTO-ENTMCNC: 33.3 G/DL (ref 31.5–36.5)
MCV RBC AUTO: 81 FL (ref 78–100)
MONOCYTES # BLD AUTO: 0.7 10E3/UL (ref 0–1.3)
MONOCYTES NFR BLD AUTO: 7 %
NEUTROPHILS # BLD AUTO: 6.8 10E3/UL (ref 1.6–8.3)
NEUTROPHILS NFR BLD AUTO: 63 %
PLATELET # BLD AUTO: 262 10E3/UL (ref 150–450)
POTASSIUM SERPL-SCNC: 4.7 MMOL/L (ref 3.4–5.3)
PROT SERPL-MCNC: 7.5 G/DL (ref 6.4–8.3)
RBC # BLD AUTO: 5.74 10E6/UL (ref 4.4–5.9)
RETICS # AUTO: 0.08 10E6/UL (ref 0.03–0.1)
RETICS/RBC NFR AUTO: 1.5 % (ref 0.5–2)
SODIUM SERPL-SCNC: 138 MMOL/L (ref 136–145)
WBC # BLD AUTO: 10.8 10E3/UL (ref 4–11)

## 2023-08-28 PROCEDURE — 87389 HIV-1 AG W/HIV-1&-2 AB AG IA: CPT | Performed by: PHYSICIAN ASSISTANT

## 2023-08-28 PROCEDURE — 86803 HEPATITIS C AB TEST: CPT | Performed by: PHYSICIAN ASSISTANT

## 2023-08-28 PROCEDURE — 83690 ASSAY OF LIPASE: CPT | Performed by: PHYSICIAN ASSISTANT

## 2023-08-28 PROCEDURE — 80053 COMPREHEN METABOLIC PANEL: CPT | Performed by: PHYSICIAN ASSISTANT

## 2023-08-28 PROCEDURE — 85045 AUTOMATED RETICULOCYTE COUNT: CPT | Performed by: PHYSICIAN ASSISTANT

## 2023-08-28 PROCEDURE — 85025 COMPLETE CBC W/AUTO DIFF WBC: CPT | Performed by: PHYSICIAN ASSISTANT

## 2023-08-28 PROCEDURE — 36415 COLL VENOUS BLD VENIPUNCTURE: CPT | Performed by: PHYSICIAN ASSISTANT

## 2023-08-28 PROCEDURE — 99213 OFFICE O/P EST LOW 20 MIN: CPT | Performed by: PHYSICIAN ASSISTANT

## 2023-08-28 PROCEDURE — 99207 BLOOD MORPHOLOGY PATHOLOGIST REVIEW: CPT | Performed by: PATHOLOGY

## 2023-08-28 RX ORDER — POLYETHYLENE GLYCOL 3350 17 G/17G
17 POWDER, FOR SOLUTION ORAL DAILY
Qty: 510 G | Refills: 0 | Status: SHIPPED | OUTPATIENT
Start: 2023-08-28 | End: 2023-09-11

## 2023-08-28 ASSESSMENT — PATIENT HEALTH QUESTIONNAIRE - PHQ9
10. IF YOU CHECKED OFF ANY PROBLEMS, HOW DIFFICULT HAVE THESE PROBLEMS MADE IT FOR YOU TO DO YOUR WORK, TAKE CARE OF THINGS AT HOME, OR GET ALONG WITH OTHER PEOPLE: NOT DIFFICULT AT ALL
SUM OF ALL RESPONSES TO PHQ QUESTIONS 1-9: 3
SUM OF ALL RESPONSES TO PHQ QUESTIONS 1-9: 3

## 2023-08-28 ASSESSMENT — PAIN SCALES - GENERAL: PAINLEVEL: NO PAIN (0)

## 2023-08-28 NOTE — PROGRESS NOTES
Assessment & Plan     (Z87.898) History of splenomegaly  (primary encounter diagnosis)  Comment: Patient with history of splenomegaly.  He did not have any subsequent imaging of the site.  Discussed with patient ultrasound of the abdomen.  Also discussed screening labs.  Exact etiology not entirely clear.  The patient was ill at time of prior evaluation.  This may be contributory.  Exact etiology of the patient's symptoms of left quadrant abdominal pain as well as the source of splenomegaly not clear.  Return with any worsening new concerns.  Plan: CBC with platelets and differential, Lab Blood         Morphology Pathologist Review, Comprehensive         metabolic panel (BMP + Alb, Alk Phos, ALT, AST,        Total. Bili, TP), US Abdomen Complete          (K59.00) Constipation, unspecified constipation type  Comment: The patient does endorse a firm small stools.  Consistent with constipation.  Will trial MiraLAX over the next 2 weeks.  Follow-up for recheck.  Plan: polyethylene glycol (MIRALAX) 17 GM/Dose powder          (Z11.4) Screening for HIV (human immunodeficiency virus)  Comment: Discussed screening lab  Plan: HIV Antigen Antibody Combo          (Z11.59) Need for hepatitis C screening test  Comment: Discussed screening lab  Plan: Hepatitis C Screen Reflex to HCV RNA Quant and         Genotype          (R10.12) Left upper quadrant pain  Comment: Left upper quadrant abdominal pain.  Discussed with patient lipase assessing for pancreas function.  Patient does have elevated BMI possibility of hepatic steatosis.  Plan: Lipase                BMI:   Estimated body mass index is 44.51 kg/m  as calculated from the following:    Height as of this encounter: 1.829 m (6').    Weight as of this encounter: 148.9 kg (328 lb 3.2 oz).       Bryn Clark PA-C  [facility] is a 17 year old, presenting for the following health issues:  Abdominal Pain (Abdominal Pressure.  )      History of Present Illness       Reason for visit:  Long overdue follow up for enlarged spleen    He eats 0-1 servings of fruits and vegetables daily.He consumes 4 sweetened beverage(s) daily.He exercises with enough effort to increase his heart rate 9 or less minutes per day.  He exercises with enough effort to increase his heart rate 3 or less days per week.   He is taking medications regularly.     Patient was seen in the emergency department November 2022.  He was at the episodic abdominal pain.  Was having nausea and vomiting at that time.  Ultrasound did not reveal evidence of splenomegaly.  Patient had not followed up on that finding.  Continues to have intermittent left-sided abdominal fullness.  Patient does endorse more constipation as of late.  No vomiting.  No bloody stools.  No cough or shortness of breath.  No fevers or chills.  Patient denies any ongoing infectious symptoms.           Review of Systems   Constitutional, HEENT, cardiovascular, pulmonary, gi and gu systems are negative, except as otherwise noted.      Objective    /85   Pulse 94   Temp 97.9  F (36.6  C) (Tympanic)   Resp 18   Ht 1.829 m (6')   Wt 148.9 kg (328 lb 3.2 oz)   SpO2 97%   BMI 44.51 kg/m    Body mass index is 44.51 kg/m .  Physical Exam   GENERAL: healthy, alert and no distress  RESP: lungs clear to auscultation - no rales, rhonchi or wheezes  CV: regular rate and rhythm, normal S1 S2, no S3 or S4, no murmur, click or rub, no peripheral edema and peripheral pulses strong  ABDOMEN: Mild left upper quadrant tenderness.  No rebound or guarding.  No organomegaly on examination.  MS: no gross musculoskeletal defects noted, no edema                    Answers submitted by the patient for this visit:  Patient Health Questionnaire (Submitted on 8/28/2023)  If you checked off any problems, how difficult have these problems made it for you to do your work, take care of things at home, or get along with other  people?: Not difficult at all  PHQ9 TOTAL SCORE: 3  General Questionnaire (Submitted on 8/28/2023)  Chief Complaint: Chronic problems general questions HPI Form  What is the reason for your visit today? : Long overdue follow up for enlarged spleen  How many servings of fruits and vegetables do you eat daily?: 0-1  On average, how many sweetened beverages do you drink each day (Examples: soda, juice, sweet tea, etc.  Do NOT count diet or artificially sweetened beverages)?: 4  How many minutes a day do you exercise enough to make your heart beat faster?: 9 or less  How many days a week do you exercise enough to make your heart beat faster?: 3 or less  How many days per week do you miss taking your medication?: 0

## 2023-08-29 LAB
PATH REPORT.COMMENTS IMP SPEC: NORMAL
PATH REPORT.FINAL DX SPEC: NORMAL
PATH REPORT.MICROSCOPIC SPEC OTHER STN: NORMAL
PATH REPORT.MICROSCOPIC SPEC OTHER STN: NORMAL

## 2023-08-29 NOTE — RESULT ENCOUNTER NOTE
Mr. Bhakta,     Normal kidney function liver function studies.  No abnormalities seen on CBC or blood smear.    No evidence of HIV or hepatitis C on screening labs.  Normal pancreas function study.    Please obtain ultrasound as discussed.  Return with any worsening new concerns.    Sincerely,    Bryn Clark PA-C

## 2023-09-07 ENCOUNTER — TELEPHONE (OUTPATIENT)
Dept: PEDIATRICS | Facility: CLINIC | Age: 18
End: 2023-09-07
Payer: COMMERCIAL

## 2023-09-07 NOTE — TELEPHONE ENCOUNTER
Patient Quality Outreach    Patient is due for the following:   Physical Preventive Adult Physical    Next Steps:   Schedule a Adult Preventative    Type of outreach:    Sent [employer] message.      Questions for provider review:    None           Eva Mitchell MA

## 2024-02-12 ENCOUNTER — OFFICE VISIT (OUTPATIENT)
Dept: URGENT CARE | Facility: URGENT CARE | Age: 19
End: 2024-02-12
Payer: COMMERCIAL

## 2024-02-12 VITALS
HEART RATE: 82 BPM | DIASTOLIC BLOOD PRESSURE: 83 MMHG | OXYGEN SATURATION: 98 % | TEMPERATURE: 98.6 F | SYSTOLIC BLOOD PRESSURE: 140 MMHG | WEIGHT: 315 LBS | RESPIRATION RATE: 15 BRPM | BODY MASS INDEX: 43.92 KG/M2

## 2024-02-12 DIAGNOSIS — J02.9 VIRAL PHARYNGITIS: ICD-10-CM

## 2024-02-12 DIAGNOSIS — H60.332 ACUTE SWIMMER'S EAR OF LEFT SIDE: Primary | ICD-10-CM

## 2024-02-12 DIAGNOSIS — R07.0 THROAT PAIN: ICD-10-CM

## 2024-02-12 LAB — DEPRECATED S PYO AG THROAT QL EIA: NEGATIVE

## 2024-02-12 PROCEDURE — 87651 STREP A DNA AMP PROBE: CPT | Performed by: STUDENT IN AN ORGANIZED HEALTH CARE EDUCATION/TRAINING PROGRAM

## 2024-02-12 PROCEDURE — 99213 OFFICE O/P EST LOW 20 MIN: CPT | Performed by: STUDENT IN AN ORGANIZED HEALTH CARE EDUCATION/TRAINING PROGRAM

## 2024-02-12 RX ORDER — NEOMYCIN SULFATE, POLYMYXIN B SULFATE AND HYDROCORTISONE 10; 3.5; 1 MG/ML; MG/ML; [USP'U]/ML
3 SUSPENSION/ DROPS AURICULAR (OTIC) 3 TIMES DAILY
Qty: 10 ML | Refills: 0 | Status: SHIPPED | OUTPATIENT
Start: 2024-02-12 | End: 2024-02-19

## 2024-02-12 NOTE — LETTER
February 12, 2024      Kyle Bhakta  [address]  [place] [facility] [place] [address]        To Whom It May Concern:    Kyle Bhakta  was seen on 2/12/24.  Please excuse him from work tomorrow due to illness.        Sincerely,        SUSANA Cagle CNP

## 2024-02-13 LAB — GROUP A STREP BY PCR: NOT DETECTED

## 2024-02-13 NOTE — PROGRESS NOTES
Assessment & Plan     Acute swimmer's ear of left side  - neomycin-polymyxin-hydrocortisone (CORTISPORIN) [id number] otic suspension  Dispense: 10 mL; Refill: 0    Viral pharyngitis  Lab test rapid strep negative. Awaiting strep PCR result and will treat with antibiotic if this comes back positive. I advised treating viral pharyngitis with supportive measures of rest, pushing fluids to avoid dehydration, OTC Tylenol or ibuprofen as needed per label directions for pain or fever. Discussed that symptoms of viral illnesses tend to be worst on days 2-4 then gradually improve over the course of 7-10 days. We also discussed that if the symptoms persist, I recommended patient is seen again by a provider either in urgent care or in family practice.     Throat pain  - Streptococcus A Rapid Screen w/Reflex to PCR - Clinic Collect  - Group A Streptococcus PCR Throat Swab         No follow-ups on file.    Yolanda Lozoya, SUSANA CNP  [facility] URGENT C[facility]     Ana is a 18 year old male who presents to clinic today for the following health issues:  Chief Complaint   Patient presents with    Pharyngitis     Hurts to swallow, cough, feverish. Sx 3 days.      HPI      Review of Systems  Constitutional, HEENT, cardiovascular, pulmonary, GI, , musculoskeletal, neuro, skin, endocrine and psych systems are negative, except as otherwise noted.      Objective    BP (!) 140/83 (BP Location: Right arm, Cuff Size: Adult Large)   Pulse 82   Temp 98.6  F (37  C) (Tympanic)   Resp 15   Wt 146.9 kg (323 lb 12.8 oz)   SpO2 98%   BMI 43.92 kg/m    Physical Exam   GENERAL: alert and no distress  EYES: Eyes grossly normal to inspection, PERRL and conjunctivae and sclerae normal  HENT: normal cephalic/atraumatic, both ears: clear effusion and bulging membrane, nasal mucosa edematous , oral mucous membranes moist, tonsillar hypertrophy, and tonsillar erythema  NECK: bilateral anterior cervical adenopathy and  no asymmetry, masses, or scars  RESP: lungs clear to auscultation - no rales, rhonchi or wheezes  CV: regular rate and rhythm, normal S1 S2, no S3 or S4, no murmur, click or rub, no peripheral edema  MS: no gross musculoskeletal defects noted, no edema  SKIN: no suspicious lesions or rashes  NEURO: Normal strength and tone, mentation intact and speech normal    Results for orders placed or performed in visit on 02/12/24 (from the past 24 hour(s))   Streptococcus A Rapid Screen w/Reflex to PCR - Clinic Collect    Specimen: Throat; Swab   Result Value Ref Range    Group A Strep antigen Negative Negative

## 2024-02-15 ENCOUNTER — OFFICE VISIT (OUTPATIENT)
Dept: URGENT CARE | Facility: URGENT CARE | Age: 19
End: 2024-02-15
Payer: COMMERCIAL

## 2024-02-15 VITALS
OXYGEN SATURATION: 99 % | DIASTOLIC BLOOD PRESSURE: 87 MMHG | SYSTOLIC BLOOD PRESSURE: 126 MMHG | WEIGHT: 315 LBS | RESPIRATION RATE: 17 BRPM | BODY MASS INDEX: 43.18 KG/M2 | TEMPERATURE: 99 F | HEART RATE: 87 BPM

## 2024-02-15 DIAGNOSIS — K92.1 BLACK STOOL: ICD-10-CM

## 2024-02-15 DIAGNOSIS — R34 DECREASED URINE OUTPUT: Primary | ICD-10-CM

## 2024-02-15 DIAGNOSIS — R19.7 VOMITING AND DIARRHEA: ICD-10-CM

## 2024-02-15 DIAGNOSIS — R11.10 VOMITING AND DIARRHEA: ICD-10-CM

## 2024-02-15 DIAGNOSIS — J04.0 LARYNGITIS: ICD-10-CM

## 2024-02-15 DIAGNOSIS — R50.9 FEVER, UNSPECIFIED FEVER CAUSE: ICD-10-CM

## 2024-02-15 LAB
FLUAV AG SPEC QL IA: NEGATIVE
FLUBV AG SPEC QL IA: NEGATIVE

## 2024-02-15 PROCEDURE — 99215 OFFICE O/P EST HI 40 MIN: CPT | Performed by: NURSE PRACTITIONER

## 2024-02-15 PROCEDURE — 87635 SARS-COV-2 COVID-19 AMP PRB: CPT | Performed by: NURSE PRACTITIONER

## 2024-02-15 PROCEDURE — 87804 INFLUENZA ASSAY W/OPTIC: CPT | Performed by: NURSE PRACTITIONER

## 2024-02-15 NOTE — LETTER
February 15, 2024      Kyle Bhakta  [address] [place] [address]        To Whom It May Concern:    Kyle Bhakta  was seen on 2/15/24.         Sincerely,        LATASHA Ram

## 2024-02-15 NOTE — PROGRESS NOTES
"Assessment & Plan     Decreased urine output      Fever, unspecified fever cause    - Influenza A & B Antigen - Clinic Collect  - Symptomatic COVID-19 Virus (Coronavirus) by PCR Nose    Laryngitis      Vomiting and diarrhea      Black stool       Influenza negative, COVID testing in process. Recommend further evaluation in emergency room due to decreased urine output, inability to tolerate PO with vomiting and diarrhea and black stool, concern for JUICE. Black stool likely secondary to pepto bismal intake, but cannot rule out GI bleed. Patient agreeable and discharged in stable condition.         Leanna Escobar NP  [facility]RG[facility] [place]    Daphne Perez is a 18 year old male who presents to clinic today for the following health issues:  Chief Complaint   Patient presents with    Nausea, Vomiting, & Diarrhea     Fever, losing voice. Sx started Tuesday. Pt was seen on Monday for sore throat and ear pain, has not started prescription for ears.     Letter for School/Work     For work stating seen today and awaiting Covid results.      Patient presents for evaluation of sore throat. Associated symptoms: fever 100-102F, nausea, vomiting, diarrhea, losing voice. Has thrown up twice in the past 24 hours, about 8 times in the past 3 days. Has had about 4 episodes of diarrhea in the past 24 hours, over 20 in the past 3 days. Denies bloody stool. Denies abdominal pain, headache, dizziness, weakness, ear pain. Symptoms have been stable and have been present for 3 days. Has a sore throat after throwing up.  Has been able to drink fluids and void, but only voiding 1-2 times in the past 24-hours for the past few days. Has not tested for COVID. Has been sitting on couch \"hoping to God I don't die.\" Has been taking tylenol, last had yesterday and pepto bismal yesterday. Has only been able to drink 1 cup of water a day due to throwing up. Has been having black coffee ground stool.     Patient was evaluated in " urgent care 2/12/24 and diagnosed with left swimmers ear when strep testing was negative and has not started ear drops.     Problem list, Medication list, Allergies, and Medical history reviewed in EPIC.    ROS:  Review of systems negative except for noted above        Objective    /87 (BP Location: Right arm, Cuff Size: Adult Large)   Pulse 87   Temp 99  F (37.2  C) (Tympanic)   Resp 17   Wt 144.4 kg (318 lb 6.4 oz)   SpO2 99%   BMI 43.18 kg/m    Physical Exam  Constitutional:       General: He is not in acute distress.     Appearance: He is obese. He is not toxic-appearing or diaphoretic.   HENT:      Head: Normocephalic and atraumatic.      Right Ear: Tympanic membrane, ear canal and external ear normal.      Left Ear: Tympanic membrane, ear canal and external ear normal.      Nose:      Comments: Moderate nasal congestion     Mouth/Throat:      Mouth: Mucous membranes are dry.      Pharynx: Oropharynx is clear. Posterior oropharyngeal erythema present. No oropharyngeal exudate.      Comments: Post nasal drip, mild oropharyngeal erythema  Cardiovascular:      Rate and Rhythm: Normal rate and regular rhythm.      Heart sounds: Normal heart sounds.   Pulmonary:      Effort: Pulmonary effort is normal. No respiratory distress.      Breath sounds: Normal breath sounds. No wheezing, rhonchi or rales.   Abdominal:      General: Bowel sounds are normal. There is no distension.      Palpations: Abdomen is soft.      Tenderness: There is no abdominal tenderness. There is no guarding or rebound.   Lymphadenopathy:      Cervical: No cervical adenopathy.   Skin:     General: Skin is warm and dry.   Neurological:      Mental Status: He is alert.          Labs:  Results for orders placed or performed in visit on 02/15/24   Influenza A & B Antigen - Clinic Collect     Status: Normal    Specimen: Nose; Swab   Result Value Ref Range    Influenza A antigen Negative Negative    Influenza B antigen Negative Negative     Narrative    Test results must be correlated with clinical data. If necessary, results should be confirmed by a molecular assay or viral culture.

## 2024-02-15 NOTE — PATIENT INSTRUCTIONS
Go to emergency room for further evaluation of dehydration with vomiting and diarrhea, only able to void 1-2 times per day in the past few days, black coffee ground stools

## 2024-02-16 LAB — SARS-COV-2 RNA RESP QL NAA+PROBE: NEGATIVE

## 2024-03-28 ENCOUNTER — TELEPHONE (OUTPATIENT)
Dept: FAMILY MEDICINE | Facility: CLINIC | Age: 19
End: 2024-03-28
Payer: COMMERCIAL

## 2024-03-28 DIAGNOSIS — F33.1 MAJOR DEPRESSIVE DISORDER, RECURRENT EPISODE, MODERATE (H): ICD-10-CM

## 2024-03-28 RX ORDER — FLUOXETINE 40 MG/1
40 CAPSULE ORAL EVERY EVENING
Status: CANCELLED | OUTPATIENT
Start: 2024-03-28

## 2024-03-28 NOTE — TELEPHONE ENCOUNTER
Called pt to relay message from provider. Pt stated he would contact [facility] to set up appointment. Pt requested to cancel his appointment for Tuesday.     Thank you - Diane Crump, EDWARDON, RN

## 2024-03-28 NOTE — TELEPHONE ENCOUNTER
Pt calling due to being on his last day of medication. He wanted to make an appointment to have Javier Clark take over his medications. Pt informed writer that today is his last day of medications and he should be in contact with his mental health provider to have them refilled if he needs them right away. Per pt he no longer see's a mental health provider and would like Javier to take over meds. Scheduled appointment for Tuesday 4/2/24. Pt is asking if he can get a small refill on medications until he meets with provider on Tuesday. Advised I would send request to provider but it is at his discretion if he would like to fill.     Routing to provider - Pt would like you to take over his 3 medications. The one he is requesting an small fill on until he meets with you on Tuesday 4/2, is the Fluoxetine 40mg. Medication & Pharmacy pended, if appropriate.     Thank you - Diane Crump, BSN, RN

## 2024-03-28 NOTE — TELEPHONE ENCOUNTER
Per last psychiatry note patient was to transition to adult psychiatric care.    Plan to transition care to adult psychiatry clinic at [facility]. Provided phone number ([phone]).     Should be following with psychiatry for ongoing psychiatric medication management.     Bryn Clark PA-C

## 2024-10-10 ENCOUNTER — TELEPHONE (OUTPATIENT)
Dept: FAMILY MEDICINE | Facility: CLINIC | Age: 19
End: 2024-10-10
Payer: COMMERCIAL

## 2024-10-10 NOTE — TELEPHONE ENCOUNTER
Patient Quality Outreach    Patient is due for the following:   Physical Preventive Adult Physical      Topic Date Due    Flu Vaccine (1) 09/01/2024    COVID-19 Vaccine (4 - 2024-25 season) 09/01/2024       Next Steps:   Schedule a Adult Preventative    Type of outreach:    Sent [employer] message.      Questions for provider review:               Henrry Brannon MA

## 2024-10-10 NOTE — TELEPHONE ENCOUNTER
Patient Quality Outreach    Patient is due for the following:   Physical Preventive Adult Physical      Topic Date Due    Flu Vaccine (1) 09/01/2024    COVID-19 Vaccine (4 - 2024-25 season) 09/01/2024       Next Steps:   Schedule a Adult Preventative    Type of outreach:    Sent [employer] message.      Questions for provider review:               Henrry Brannon MA

## 2024-11-16 ENCOUNTER — HEALTH MAINTENANCE LETTER (OUTPATIENT)
Age: 19
End: 2024-11-16

## 2024-12-07 NOTE — ED PROVIDER NOTES
Emergency Department Patient Sign-out       Brief HPI:  This is a 16 year old male signed out to me .  See initial ED Provider note for details of the presentation.          Patient is medically cleared for admission to a Behavioral Health unit.      The patient is not on a hold.      The patient has not required medication for agitation.    Awaiting Transfer to Mental Health Facility        Significant Events prior to my assuming care: Awaiting bed placement      Exam:   Patient Vitals for the past 24 hrs:   BP Temp Temp src Pulse Resp SpO2   04/14/22 0920 131/80 98  F (36.7  C) Oral 87 16 97 %   04/14/22 0021 121/73 97.2  F (36.2  C) Oral 79 16 99 %   04/13/22 1927 109/58 (!) 96.4  F (35.8  C) Oral 86 16 98 %           ED RESULTS:   No results found for this visit on 04/12/22 (from the past 24 hour(s)).    ED MEDICATIONS:   Medications   escitalopram (LEXAPRO) tablet 10 mg (10 mg Oral Given 4/13/22 1924)   guanFACINE HCl (INTUNIV) 24 hr tablet 3 mg (3 mg Oral Given 4/13/22 2136)   lurasidone (LATUDA) tablet 40 mg (40 mg Oral Given 4/13/22 2136)   Vitamin D3 (CHOLECALCIFEROL) tablet 100 mcg (100 mcg Oral Given 4/14/22 0947)   melatonin tablet 10 mg (10 mg Oral Given 4/13/22 2139)   acetaminophen (TYLENOL) tablet 1,000 mg (1,000 mg Oral Given 4/14/22 0946)   acetaminophen (TYLENOL) tablet 1,000 mg (1,000 mg Oral Given 4/13/22 1124)         Impression:    ICD-10-CM    1. Major depressive disorder, recurrent episode, moderate (H)  F33.1    2. Suicidal ideation  R45.851        Plan: Awaiting bed placement          MD Jenelle Higginbotham, Mayo Kwan MD  04/14/22 4300     95

## 2025-07-10 NOTE — PLAN OF CARE
Problem: Depression  Goal: Improved Mood  Outcome: Ongoing, Progressing   Goal Outcome Evaluation:      This RN has awoken pt at 1230 for a lab draw. He was compliant. He has been sleeping all night and day. He did get up for lunch stating he was surprised that he slept this long.   He has contracted for safety but lucas endorse depression.  Will continue to assess.                   Ondina Jerome